# Patient Record
Sex: FEMALE | Race: BLACK OR AFRICAN AMERICAN | NOT HISPANIC OR LATINO | Employment: UNEMPLOYED | URBAN - METROPOLITAN AREA
[De-identification: names, ages, dates, MRNs, and addresses within clinical notes are randomized per-mention and may not be internally consistent; named-entity substitution may affect disease eponyms.]

---

## 2023-07-11 ENCOUNTER — VIRTUAL VISIT (OUTPATIENT)
Dept: URGENT CARE | Facility: CLINIC | Age: 28
End: 2023-07-11
Payer: MEDICAID

## 2023-07-11 DIAGNOSIS — J30.9 ALLERGIC RHINITIS, UNSPECIFIED SEASONALITY, UNSPECIFIED TRIGGER: Primary | ICD-10-CM

## 2023-07-11 PROBLEM — G47.00 INSOMNIA: Status: ACTIVE | Noted: 2021-06-04

## 2023-07-11 PROBLEM — K21.9 CHRONIC GERD: Status: ACTIVE | Noted: 2021-07-30

## 2023-07-11 PROBLEM — F90.0 ATTENTION DEFICIT HYPERACTIVITY DISORDER (ADHD), PREDOMINANTLY INATTENTIVE TYPE: Status: ACTIVE | Noted: 2021-09-24

## 2023-07-11 PROBLEM — F41.1 GAD (GENERALIZED ANXIETY DISORDER): Status: ACTIVE | Noted: 2021-05-20

## 2023-07-11 PROCEDURE — 99203 OFFICE O/P NEW LOW 30 MIN: CPT | Mod: VID

## 2023-07-11 RX ORDER — DEXTROAMPHETAMINE SACCHARATE, AMPHETAMINE ASPARTATE MONOHYDRATE, DEXTROAMPHETAMINE SULFATE AND AMPHETAMINE SULFATE 2.5; 2.5; 2.5; 2.5 MG/1; MG/1; MG/1; MG/1
CAPSULE, EXTENDED RELEASE ORAL
COMMUNITY
Start: 2021-10-01

## 2023-07-11 RX ORDER — BUPROPION HYDROCHLORIDE 150 MG/1
1 TABLET, EXTENDED RELEASE ORAL DAILY
COMMUNITY
Start: 2023-05-12

## 2023-07-11 RX ORDER — FLUTICASONE PROPIONATE 50 MCG
1 SPRAY, SUSPENSION (ML) NASAL DAILY
Qty: 16 G | Refills: 1 | Status: SHIPPED | OUTPATIENT
Start: 2023-07-11

## 2023-07-11 RX ORDER — FAMOTIDINE 20 MG/1
1 TABLET, FILM COATED ORAL 2 TIMES DAILY
COMMUNITY
Start: 2023-05-12

## 2023-07-11 NOTE — LETTER
July 11, 2023      Tito Blanchard  [address]  [address]  [facility] [address]        To Whom It May Concern:    Tito Blanchard  was seen on 7/11/23  Please excuse her from work missed today, July 11, 2023.   She can return to work 7/12/23, however she needs to avoid being outdoors for the remainder of the week.         Sincerely,    Jaz Handley [facility].

## 2023-07-11 NOTE — PROGRESS NOTES
Tito is a 28 year old who is being evaluated via a billable video visit.          Assessment & Plan     Allergic rhinitis, unspecified seasonality, unspecified trigger    - fluticasone (FLONASE) 50 MCG/ACT nasal spray; Spray 1 spray into both nostrils daily      10 minutes spent by me on the date of the encounter doing chart review, patient visit and documentation        See Patient Instructions    Return in about 1 week (around 7/18/2023), or if symptoms worsen or fail to improve.    Virtual Urgent Care  [facility] VIRTUAL URGENT CARE    Subjective   Tito is a 28 year old, presenting for the following health issues:  No chief complaint on file.         No data to display              HPI     Has seasonal allergies and mild intermittent asthma.  This past week she has been using Claritin to control her allergies, however works at a  and is outside a lot.  Has been having to use her albuterol inhaler more frequently.  Is not using it every day though.  Requesting a note for work that she missed today.  Normally sees a provider at [facility], and asthma is very well controlled usually.       Review of Systems   Constitutional, HEENT, cardiovascular, pulmonary, gi and gu systems are negative, except as otherwise noted.      Objective           Vitals:  No vitals were obtained today due to virtual visit.    Physical Exam   GENERAL: Healthy, alert and no distress  RESP: No audible wheeze, cough, or visible cyanosis.  No visible retractions or increased work of breathing.    PSYCH: Mentation appears normal, affect normal/bright, judgement and insight intact, normal speech and appearance well-groomed.          Video-Visit Details    Type of service:  Video Visit   Video Start Time: 1700  Video End Time:1706    Originating Location (pt. Location): Home  Distant Location (provider location):  Off-site  Platform used for Video Visit: [employer]

## 2023-07-11 NOTE — PATIENT INSTRUCTIONS
Start using Flonase daily.  Continue with Claritin.  Use your albuterol if needed.  If symptoms do not improve in a week please see your primary care provider at [facility]

## 2023-07-22 ENCOUNTER — HEALTH MAINTENANCE LETTER (OUTPATIENT)
Age: 28
End: 2023-07-22

## 2024-09-14 ENCOUNTER — HEALTH MAINTENANCE LETTER (OUTPATIENT)
Age: 29
End: 2024-09-14